# Patient Record
Sex: MALE | Race: WHITE | NOT HISPANIC OR LATINO
[De-identification: names, ages, dates, MRNs, and addresses within clinical notes are randomized per-mention and may not be internally consistent; named-entity substitution may affect disease eponyms.]

---

## 2017-05-12 ENCOUNTER — APPOINTMENT (OUTPATIENT)
Dept: CARDIOLOGY | Facility: CLINIC | Age: 37
End: 2017-05-12

## 2018-04-20 ENCOUNTER — RX RENEWAL (OUTPATIENT)
Age: 38
End: 2018-04-20

## 2018-04-23 ENCOUNTER — MEDICATION RENEWAL (OUTPATIENT)
Age: 38
End: 2018-04-23

## 2018-05-11 ENCOUNTER — APPOINTMENT (OUTPATIENT)
Dept: CARDIOLOGY | Facility: CLINIC | Age: 38
End: 2018-05-11

## 2018-07-10 ENCOUNTER — RX RENEWAL (OUTPATIENT)
Age: 38
End: 2018-07-10

## 2018-08-03 ENCOUNTER — APPOINTMENT (OUTPATIENT)
Dept: CARDIOLOGY | Facility: CLINIC | Age: 38
End: 2018-08-03

## 2018-08-03 ENCOUNTER — APPOINTMENT (OUTPATIENT)
Dept: CARDIOLOGY | Facility: CLINIC | Age: 38
End: 2018-08-03
Payer: COMMERCIAL

## 2018-08-03 ENCOUNTER — NON-APPOINTMENT (OUTPATIENT)
Age: 38
End: 2018-08-03

## 2018-08-03 VITALS
HEIGHT: 72 IN | DIASTOLIC BLOOD PRESSURE: 70 MMHG | HEART RATE: 101 BPM | SYSTOLIC BLOOD PRESSURE: 128 MMHG | BODY MASS INDEX: 36.7 KG/M2 | WEIGHT: 271 LBS

## 2018-08-03 PROCEDURE — 93000 ELECTROCARDIOGRAM COMPLETE: CPT

## 2018-08-03 PROCEDURE — 99214 OFFICE O/P EST MOD 30 MIN: CPT

## 2018-10-05 ENCOUNTER — MEDICATION RENEWAL (OUTPATIENT)
Age: 38
End: 2018-10-05

## 2019-04-09 ENCOUNTER — MEDICATION RENEWAL (OUTPATIENT)
Age: 39
End: 2019-04-09

## 2019-05-06 ENCOUNTER — RX RENEWAL (OUTPATIENT)
Age: 39
End: 2019-05-06

## 2019-07-09 ENCOUNTER — MEDICATION RENEWAL (OUTPATIENT)
Age: 39
End: 2019-07-09

## 2019-07-12 ENCOUNTER — MEDICATION RENEWAL (OUTPATIENT)
Age: 39
End: 2019-07-12

## 2019-08-08 ENCOUNTER — APPOINTMENT (OUTPATIENT)
Dept: CARDIOLOGY | Facility: CLINIC | Age: 39
End: 2019-08-08

## 2019-11-12 ENCOUNTER — MEDICATION RENEWAL (OUTPATIENT)
Age: 39
End: 2019-11-12

## 2019-11-14 ENCOUNTER — OUTPATIENT (OUTPATIENT)
Dept: OUTPATIENT SERVICES | Facility: HOSPITAL | Age: 39
LOS: 1 days | End: 2019-11-14

## 2020-07-06 ENCOUNTER — NON-APPOINTMENT (OUTPATIENT)
Age: 40
End: 2020-07-06

## 2020-07-06 ENCOUNTER — APPOINTMENT (OUTPATIENT)
Dept: CARDIOLOGY | Facility: CLINIC | Age: 40
End: 2020-07-06
Payer: COMMERCIAL

## 2020-07-06 VITALS
TEMPERATURE: 98.2 F | WEIGHT: 270 LBS | HEART RATE: 105 BPM | SYSTOLIC BLOOD PRESSURE: 138 MMHG | BODY MASS INDEX: 36.57 KG/M2 | HEIGHT: 72 IN | OXYGEN SATURATION: 97 % | DIASTOLIC BLOOD PRESSURE: 80 MMHG

## 2020-07-06 PROCEDURE — 99214 OFFICE O/P EST MOD 30 MIN: CPT

## 2020-07-06 PROCEDURE — 93000 ELECTROCARDIOGRAM COMPLETE: CPT

## 2020-07-06 RX ORDER — AMLODIPINE BESYLATE 10 MG/1
10 TABLET ORAL DAILY
Refills: 0 | Status: DISCONTINUED | COMMUNITY
End: 2020-07-06

## 2020-07-06 NOTE — PHYSICAL EXAM
[General Appearance - Well Developed] : well developed [Normal Appearance] : normal appearance [Well Groomed] : well groomed [General Appearance - Well Nourished] : well nourished [Normal Conjunctiva] : the conjunctiva exhibited no abnormalities [No Deformities] : no deformities [General Appearance - In No Acute Distress] : no acute distress [Eyelids - No Xanthelasma] : the eyelids demonstrated no xanthelasmas [No Oral Pallor] : no oral pallor [No Oral Cyanosis] : no oral cyanosis [Normal Jugular Venous A Waves Present] : normal jugular venous A waves present [Normal Jugular Venous V Waves Present] : normal jugular venous V waves present [No Jugular Venous Clarke A Waves] : no jugular venous clarke A waves [Respiration, Rhythm And Depth] : normal respiratory rhythm and effort [Exaggerated Use Of Accessory Muscles For Inspiration] : no accessory muscle use [Heart Rate And Rhythm] : heart rate and rhythm were normal [Heart Sounds] : normal S1 and S2 [Murmurs] : no murmurs present [Abdomen Soft] : soft [Abdomen Tenderness] : non-tender [Abdomen Mass (___ Cm)] : no abdominal mass palpated [Gait - Sufficient For Exercise Testing] : the gait was sufficient for exercise testing [Abnormal Walk] : normal gait [Cyanosis, Localized] : no localized cyanosis [Nail Clubbing] : no clubbing of the fingernails [Petechial Hemorrhages (___cm)] : no petechial hemorrhages [] : no ischemic changes [Skin Turgor] : normal skin turgor [Skin Color & Pigmentation] : normal skin color and pigmentation [Affect] : the affect was normal [Impaired Insight] : insight and judgment were intact [Oriented To Time, Place, And Person] : oriented to person, place, and time [FreeTextEntry1] : +2 pitting BL calf edema

## 2020-07-06 NOTE — HISTORY OF PRESENT ILLNESS
[FreeTextEntry1] : Mr. Bey is a 39-year-old gentleman last seen in our office approximately 2 years ago. He has a past medical history significant for asthma, insomnia, anxiety, ETOH use. Newly dx with HTN by PCP. He is chronic heavy smoker. \par \par There is no prior history of myocardial infarction, coronary revascularization, history of ischemic heart disease, or symptomatic congestive heart failure. There is no history of symptomatic arrhythmias including atrial fibrillation. There is no history of diabetes or renal insufficiency. There is no history of cerebrovascular events. \par \par He presents at the request of PCP for evaluation of LE edema. This has been ongoing since start of norvasc 10mg daily. He was previously on beta blocker which he felt confused so it was discontinued. He does report MARINELLI attributed to smoking and asthma. 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 Denies exertional chest discomfort. Denies palpitations, dizziness, orthopnea, or syncopal event. \par \par He also reports excessive snoring and has not been evaluated for sleep apnea. \par \par He continues to smoke and does not have a desire to quit. He understands the ramifications of continued tobacco use to include premature cancer, chronic obstructive pulmonary disease, emphysema, and worsening asthma.\par \par \par

## 2020-07-06 NOTE — REVIEW OF SYSTEMS
[Dyspnea on exertion] : dyspnea during exertion [Lower Ext Edema] : lower extremity edema [see HPI] : see HPI [Wheezing] : wheezing [Negative] : Heme/Lymph

## 2020-07-06 NOTE — REASON FOR VISIT
[Follow-Up - Clinic] : a clinic follow-up of [Dyspnea] : dyspnea [Hypertension] : hypertension [FreeTextEntry1] : edema

## 2020-07-06 NOTE — DISCUSSION/SUMMARY
[FreeTextEntry1] : KENNY LOCKWOOD is a 39 year old M who presents today Jul 06, 2020 with the above history and the following active issues: \par \par 1. MARINELLI/edema - Most likely edema r/t norvasc use. No prior cardiac studies have been done. EKG today shows early repolarization. Obtain 2d echocardiogram to evaluate resting heart structure, valvular function, and LVEF. Obtain exercise stress test to assess BP response to exercise, exercise induced arrhythmias, or evidence of ischemia. \par \par 2. Tobacco use -  I have recommended that he stop tobacco in all forms. He declines and states he is not yet ready to quit.\par \par 3. HTN - Discontinue norvasc. Start losartan/hctz 50-12.5mg daily. Check BMP next week. Recommend low salt diet and weight loss. \par  \par F/U after testing  in office to check BP and review. \par Any questions and concerns were addressed and resolved. \par \par Sincerely,\par \par JESSICA Trinidad\par Patients history, testing, and plan reviewed with supervising MD: Dr. Beck Miranda  \par \par

## 2020-07-06 NOTE — ADDENDUM
[FreeTextEntry1] : Please note the patient was reviewed with the NP.\par I was physically present during the service of the patient\par I was directly involved in the management plan and recommendations of care provided to the patient. \par I personally reviewed the history and physical exam and plan as documented by the NP above.\par \par Beck Miranda DO, FACC, RPVI\par Cardiologist\par 07/06/2020

## 2020-09-17 ENCOUNTER — APPOINTMENT (OUTPATIENT)
Dept: CARDIOLOGY | Facility: CLINIC | Age: 40
End: 2020-09-17
Payer: COMMERCIAL

## 2020-09-17 ENCOUNTER — OUTPATIENT (OUTPATIENT)
Dept: OUTPATIENT SERVICES | Facility: HOSPITAL | Age: 40
LOS: 1 days | End: 2020-09-17

## 2020-09-17 PROCEDURE — 93015 CV STRESS TEST SUPVJ I&R: CPT

## 2020-09-17 PROCEDURE — 93306 TTE W/DOPPLER COMPLETE: CPT

## 2020-10-08 ENCOUNTER — APPOINTMENT (OUTPATIENT)
Dept: CARDIOLOGY | Facility: CLINIC | Age: 40
End: 2020-10-08
Payer: COMMERCIAL

## 2020-10-08 VITALS
SYSTOLIC BLOOD PRESSURE: 114 MMHG | WEIGHT: 282 LBS | BODY MASS INDEX: 38.25 KG/M2 | OXYGEN SATURATION: 95 % | HEART RATE: 105 BPM | TEMPERATURE: 97.5 F | DIASTOLIC BLOOD PRESSURE: 64 MMHG

## 2020-10-08 PROCEDURE — 99214 OFFICE O/P EST MOD 30 MIN: CPT

## 2020-12-01 ENCOUNTER — APPOINTMENT (OUTPATIENT)
Dept: DISASTER EMERGENCY | Facility: CLINIC | Age: 40
End: 2020-12-01

## 2020-12-02 LAB — SARS-COV-2 N GENE NPH QL NAA+PROBE: NOT DETECTED

## 2020-12-04 ENCOUNTER — OUTPATIENT (OUTPATIENT)
Dept: OUTPATIENT SERVICES | Facility: HOSPITAL | Age: 40
LOS: 1 days | End: 2020-12-04
Payer: COMMERCIAL

## 2020-12-04 DIAGNOSIS — G47.33 OBSTRUCTIVE SLEEP APNEA (ADULT) (PEDIATRIC): ICD-10-CM

## 2020-12-04 PROCEDURE — 95810 POLYSOM 6/> YRS 4/> PARAM: CPT | Mod: 26

## 2020-12-04 PROCEDURE — 95810 POLYSOM 6/> YRS 4/> PARAM: CPT

## 2020-12-30 ENCOUNTER — OUTPATIENT (OUTPATIENT)
Dept: OUTPATIENT SERVICES | Facility: HOSPITAL | Age: 40
LOS: 1 days | End: 2020-12-30

## 2021-03-10 ENCOUNTER — APPOINTMENT (OUTPATIENT)
Dept: DISASTER EMERGENCY | Facility: CLINIC | Age: 41
End: 2021-03-10

## 2021-03-11 LAB — SARS-COV-2 N GENE NPH QL NAA+PROBE: NOT DETECTED

## 2021-04-01 ENCOUNTER — OUTPATIENT (OUTPATIENT)
Dept: OUTPATIENT SERVICES | Facility: HOSPITAL | Age: 41
LOS: 1 days | End: 2021-04-01

## 2021-04-07 ENCOUNTER — APPOINTMENT (OUTPATIENT)
Dept: DISASTER EMERGENCY | Facility: CLINIC | Age: 41
End: 2021-04-07

## 2021-04-07 DIAGNOSIS — Z01.818 ENCOUNTER FOR OTHER PREPROCEDURAL EXAMINATION: ICD-10-CM

## 2021-04-12 ENCOUNTER — APPOINTMENT (OUTPATIENT)
Dept: DISASTER EMERGENCY | Facility: CLINIC | Age: 41
End: 2021-04-12

## 2021-04-13 LAB — SARS-COV-2 N GENE NPH QL NAA+PROBE: NOT DETECTED

## 2021-04-15 ENCOUNTER — OUTPATIENT (OUTPATIENT)
Dept: OUTPATIENT SERVICES | Facility: HOSPITAL | Age: 41
LOS: 1 days | End: 2021-04-15
Payer: COMMERCIAL

## 2021-04-15 DIAGNOSIS — G47.33 OBSTRUCTIVE SLEEP APNEA (ADULT) (PEDIATRIC): ICD-10-CM

## 2021-04-15 PROCEDURE — 95811 POLYSOM 6/>YRS CPAP 4/> PARM: CPT

## 2021-04-15 PROCEDURE — 95811 POLYSOM 6/>YRS CPAP 4/> PARM: CPT | Mod: 26

## 2021-07-29 ENCOUNTER — OUTPATIENT (OUTPATIENT)
Dept: OUTPATIENT SERVICES | Facility: HOSPITAL | Age: 41
LOS: 1 days | End: 2021-07-29

## 2021-11-04 ENCOUNTER — OUTPATIENT (OUTPATIENT)
Dept: OUTPATIENT SERVICES | Facility: HOSPITAL | Age: 41
LOS: 1 days | End: 2021-11-04

## 2021-12-23 ENCOUNTER — TRANSCRIPTION ENCOUNTER (OUTPATIENT)
Age: 41
End: 2021-12-23

## 2021-12-27 ENCOUNTER — APPOINTMENT (OUTPATIENT)
Dept: CARDIOLOGY | Facility: CLINIC | Age: 41
End: 2021-12-27
Payer: COMMERCIAL

## 2021-12-27 VITALS
HEIGHT: 73 IN | SYSTOLIC BLOOD PRESSURE: 124 MMHG | TEMPERATURE: 98.2 F | WEIGHT: 285 LBS | DIASTOLIC BLOOD PRESSURE: 82 MMHG | OXYGEN SATURATION: 99 % | HEART RATE: 85 BPM | BODY MASS INDEX: 37.77 KG/M2

## 2021-12-27 DIAGNOSIS — Z01.818 ENCOUNTER FOR OTHER PREPROCEDURAL EXAMINATION: ICD-10-CM

## 2021-12-27 DIAGNOSIS — I10 ESSENTIAL (PRIMARY) HYPERTENSION: ICD-10-CM

## 2021-12-27 DIAGNOSIS — Z72.89 OTHER PROBLEMS RELATED TO LIFESTYLE: ICD-10-CM

## 2021-12-27 DIAGNOSIS — Z72.0 TOBACCO USE: ICD-10-CM

## 2021-12-27 DIAGNOSIS — R60.0 LOCALIZED EDEMA: ICD-10-CM

## 2021-12-27 DIAGNOSIS — Z86.59 PERSONAL HISTORY OF OTHER MENTAL AND BEHAVIORAL DISORDERS: ICD-10-CM

## 2021-12-27 DIAGNOSIS — F32.9 MAJOR DEPRESSIVE DISORDER, SINGLE EPISODE, UNSPECIFIED: ICD-10-CM

## 2021-12-27 DIAGNOSIS — Z87.898 PERSONAL HISTORY OF OTHER SPECIFIED CONDITIONS: ICD-10-CM

## 2021-12-27 DIAGNOSIS — G47.33 OBSTRUCTIVE SLEEP APNEA (ADULT) (PEDIATRIC): ICD-10-CM

## 2021-12-27 DIAGNOSIS — J45.909 UNSPECIFIED ASTHMA, UNCOMPLICATED: ICD-10-CM

## 2021-12-27 DIAGNOSIS — R06.02 SHORTNESS OF BREATH: ICD-10-CM

## 2021-12-27 PROCEDURE — 93000 ELECTROCARDIOGRAM COMPLETE: CPT

## 2021-12-27 PROCEDURE — 99214 OFFICE O/P EST MOD 30 MIN: CPT

## 2021-12-27 RX ORDER — METOPROLOL TARTRATE 50 MG/1
50 TABLET, FILM COATED ORAL DAILY
Qty: 30 | Refills: 0 | Status: ACTIVE | COMMUNITY
Start: 2021-12-27 | End: 1900-01-01

## 2022-05-16 ENCOUNTER — OUTPATIENT (OUTPATIENT)
Dept: OUTPATIENT SERVICES | Facility: HOSPITAL | Age: 42
LOS: 1 days | End: 2022-05-16

## 2022-05-16 DIAGNOSIS — F39 UNSPECIFIED MOOD [AFFECTIVE] DISORDER: ICD-10-CM

## 2022-05-16 DIAGNOSIS — G47.30 SLEEP APNEA, UNSPECIFIED: ICD-10-CM

## 2022-05-16 DIAGNOSIS — J45.909 UNSPECIFIED ASTHMA, UNCOMPLICATED: ICD-10-CM

## 2022-05-16 DIAGNOSIS — I10 ESSENTIAL (PRIMARY) HYPERTENSION: ICD-10-CM

## 2022-05-16 DIAGNOSIS — R60.9 EDEMA, UNSPECIFIED: ICD-10-CM

## 2022-05-24 ENCOUNTER — RX RENEWAL (OUTPATIENT)
Age: 42
End: 2022-05-24

## 2022-05-25 ENCOUNTER — RX RENEWAL (OUTPATIENT)
Age: 42
End: 2022-05-25

## 2022-05-27 ENCOUNTER — RX RENEWAL (OUTPATIENT)
Age: 42
End: 2022-05-27

## 2022-06-10 ENCOUNTER — RX RENEWAL (OUTPATIENT)
Age: 42
End: 2022-06-10

## 2022-06-22 ENCOUNTER — RX RENEWAL (OUTPATIENT)
Age: 42
End: 2022-06-22

## 2022-07-11 ENCOUNTER — RX RENEWAL (OUTPATIENT)
Age: 42
End: 2022-07-11

## 2022-08-11 ENCOUNTER — RX RENEWAL (OUTPATIENT)
Age: 42
End: 2022-08-11

## 2022-09-03 ENCOUNTER — RX RENEWAL (OUTPATIENT)
Age: 42
End: 2022-09-03

## 2022-09-10 ENCOUNTER — RX RENEWAL (OUTPATIENT)
Age: 42
End: 2022-09-10

## 2022-09-28 ENCOUNTER — RX RENEWAL (OUTPATIENT)
Age: 42
End: 2022-09-28

## 2022-10-03 RX ORDER — TRAZODONE HYDROCHLORIDE 100 MG/1
100 TABLET ORAL
Qty: 60 | Refills: 0 | Status: ACTIVE | COMMUNITY
Start: 2018-04-20 | End: 1900-01-01

## 2022-11-01 ENCOUNTER — RX RENEWAL (OUTPATIENT)
Age: 42
End: 2022-11-01

## 2022-11-03 RX ORDER — LOSARTAN POTASSIUM AND HYDROCHLOROTHIAZIDE 12.5; 5 MG/1; MG/1
50-12.5 TABLET ORAL TWICE DAILY
Qty: 180 | Refills: 0 | Status: ACTIVE | COMMUNITY
Start: 2020-07-06 | End: 1900-01-01

## 2022-11-17 RX ORDER — SPIRONOLACTONE 25 MG/1
25 TABLET ORAL DAILY
Qty: 30 | Refills: 0 | Status: ACTIVE | COMMUNITY
Start: 2020-10-08 | End: 1900-01-01

## 2023-12-26 ENCOUNTER — APPOINTMENT (OUTPATIENT)
Dept: MRI IMAGING | Facility: CLINIC | Age: 43
End: 2023-12-26

## 2023-12-29 ENCOUNTER — APPOINTMENT (OUTPATIENT)
Dept: CT IMAGING | Facility: CLINIC | Age: 43
End: 2023-12-29

## 2023-12-29 ENCOUNTER — APPOINTMENT (OUTPATIENT)
Dept: RADIOLOGY | Facility: CLINIC | Age: 43
End: 2023-12-29

## 2024-04-09 ENCOUNTER — APPOINTMENT (OUTPATIENT)
Dept: MRI IMAGING | Facility: CLINIC | Age: 44
End: 2024-04-09

## 2024-04-09 ENCOUNTER — APPOINTMENT (OUTPATIENT)
Dept: CT IMAGING | Facility: CLINIC | Age: 44
End: 2024-04-09

## 2024-04-09 ENCOUNTER — APPOINTMENT (OUTPATIENT)
Dept: RADIOLOGY | Facility: CLINIC | Age: 44
End: 2024-04-09